# Patient Record
Sex: FEMALE | Race: WHITE | NOT HISPANIC OR LATINO | URBAN - METROPOLITAN AREA
[De-identification: names, ages, dates, MRNs, and addresses within clinical notes are randomized per-mention and may not be internally consistent; named-entity substitution may affect disease eponyms.]

---

## 2018-01-26 ENCOUNTER — APPOINTMENT (OUTPATIENT)
Age: 23
Setting detail: DERMATOLOGY
End: 2018-01-30

## 2018-01-26 DIAGNOSIS — L91.8 OTHER HYPERTROPHIC DISORDERS OF THE SKIN: ICD-10-CM

## 2018-01-26 DIAGNOSIS — L40.0 PSORIASIS VULGARIS: ICD-10-CM

## 2018-01-26 DIAGNOSIS — B07.8 OTHER VIRAL WARTS: ICD-10-CM

## 2018-01-26 PROCEDURE — 99203 OFFICE O/P NEW LOW 30 MIN: CPT

## 2018-01-26 PROCEDURE — OTHER COUNSELING: OTHER

## 2018-01-26 PROCEDURE — OTHER OTHER: OTHER

## 2018-01-26 PROCEDURE — OTHER PRESCRIPTION: OTHER

## 2018-01-26 PROCEDURE — OTHER ADDITIONAL NOTES: OTHER

## 2018-01-26 RX ORDER — CLOBETASOL PROPIONATE 0.5 MG/G
CREAM TOPICAL
Qty: 1 | Refills: 1 | Status: ERX | COMMUNITY
Start: 2018-01-26

## 2018-01-26 ASSESSMENT — LOCATION ZONE DERM
LOCATION ZONE: FINGER
LOCATION ZONE: EYELID
LOCATION ZONE: TOE
LOCATION ZONE: ARM
LOCATION ZONE: FEET
LOCATION ZONE: SCALP

## 2018-01-26 ASSESSMENT — LOCATION DETAILED DESCRIPTION DERM
LOCATION DETAILED: RIGHT INDEX FINGERTIP
LOCATION DETAILED: LEFT PROXIMAL DORSAL FOREARM
LOCATION DETAILED: LEFT MIDDLE FINGERTIP
LOCATION DETAILED: LEFT RING FINGERTIP
LOCATION DETAILED: LEFT ELBOW
LOCATION DETAILED: RIGHT RING FINGERTIP
LOCATION DETAILED: RIGHT MEDIAL PLANTAR 1ST TOE
LOCATION DETAILED: LEFT LATERAL PLANTAR HEEL
LOCATION DETAILED: TIP OF RIGHT 1ST TOE
LOCATION DETAILED: RIGHT PLANTAR FOREFOOT OVERLYING 4TH METATARSAL
LOCATION DETAILED: MID-FRONTAL SCALP
LOCATION DETAILED: LEFT INDEX FINGERTIP
LOCATION DETAILED: RIGHT ELBOW
LOCATION DETAILED: RIGHT MIDDLE FINGERTIP
LOCATION DETAILED: LEFT LATERAL CANTHUS

## 2018-01-26 ASSESSMENT — LOCATION SIMPLE DESCRIPTION DERM
LOCATION SIMPLE: LEFT RING FINGER
LOCATION SIMPLE: LEFT EYELID
LOCATION SIMPLE: RIGHT RING FINGER
LOCATION SIMPLE: RIGHT PLANTAR SURFACE
LOCATION SIMPLE: PLANTAR SURFACE OF RIGHT 1ST TOE
LOCATION SIMPLE: ANTERIOR SCALP
LOCATION SIMPLE: LEFT ELBOW
LOCATION SIMPLE: LEFT MIDDLE FINGER
LOCATION SIMPLE: LEFT PLANTAR SURFACE
LOCATION SIMPLE: LEFT FOREARM
LOCATION SIMPLE: RIGHT MIDDLE FINGER
LOCATION SIMPLE: RIGHT INDEX FINGER
LOCATION SIMPLE: RIGHT ELBOW
LOCATION SIMPLE: LEFT INDEX FINGER

## 2018-01-26 NOTE — PROCEDURE: ADDITIONAL NOTES
Additional Notes: Started visit by asking more about patient's psoriasis history and if she had joint pain. Pt states, yes, joint pain in the knees and fingers. I explained we would send her to rheumatology prior to initiating a systemic treatment for her psoriasis to ensure the joint pain was thoroughly worked up and addressed. Explained joint pain can be irreversible if not managed correctly.  Pt got very upset at the need for another doc appt and that she wouldn't be getting the meds she wanted today. Explained that even if we wanted to put her on humira now we would need to do blood work prior to starting it and insurance coverage sometimes takes time. Also, her psoriasis is not flared at this time so insurance may be less likely to cover it as she does not have over 10% BSA. Pt started texting, looking up things on her phone while I was talking to her and acting very exasperated. I continued to talk to the pt to reassure her we could get her into a rheumatologist relatively quickly and the importance of treating things as a whole, not just the skin. Pt continued to ignore me and use her cell phone. I told the pt I could step out of the room if she needed to finish using her cell phone otherwise I needed her full attention. Pt started to say it was going to be more money for another doc appt, she doesn't have a car, it was expensive to uber here. At one point pt was getting teary eyed. Again tried to reassure her we need to ensure nothing else is being damaged from the potential inflammation psoriasis can cause. \\n\\nI offered to give pt a topical steroid cream for new spots that may appear, pt responded with \"it won't work anyways.\" I told her we would send it to her pharmacy and she could decide what she wanted to do

## 2018-01-26 NOTE — PROCEDURE: ADDITIONAL NOTES
Additional Notes: Pt has dealt with warts for over 10 years. Has had them cut out, sprayed with LN2, used imiquimod. Pt very frustrated with no cure. Discussed candida treatments vs compounded Wartpeel rx to save pt treatments and copay expense. \\nPt elected wartpeel at this time. Made aware of how to apply, possible side effects, and cost. [facility]. Cautioned pt about using it on the warts around her nails. Rec to do a test site on finger/cuticle area before using on spots under the nails.  Consider daily soaks in apple cider vinegar and filing warts down with a nail file. Can continue duct tape on feet warts. Explained how to use duct tape per the study done. Leave on for 6 days, file, re-apply. May need to continue for weeks Additional Notes: Pt has dealt with warts for over 10 years. Has had them cut out, sprayed with LN2, used imiquimod. Pt very frustrated with no cure. Discussed candida treatments vs compounded Wartpeel rx to save pt treatments and copay expense. \\nPt elected wartpeel at this time. Made aware of how to apply, possible side effects, and cost. [facility]. Cautioned pt about using it on the warts around her nails. Rec to do a test site on finger/cuticle area before using on spots under the nails.  Consider daily soaks in apple cider vinegar and filing warts down with a nail file. Can continue duct tape on feet warts. Explained how to use duct tape per the study done. Leave on for 6 days, file, re-apply. May need to continue for weeks

## 2018-01-26 NOTE — HPI: RASH (PSORIASIS)
Do You Have A Family History Of Psoriasis?: yes
Is This A New Presentation, Or A Follow-Up?: Psoriasis
Additional History: pt states she stopped humira because she lost insurance

## 2018-01-26 NOTE — PROCEDURE: OTHER
Other (Free Text): warts on feet were pared. pt declined LN2 or other in office treatments due to working on her feet and now wanting irritation.
Note Text (......Xxx Chief Complaint.): This diagnosis correlates with the
Detail Level: Zone